# Patient Record
Sex: MALE | Race: WHITE | NOT HISPANIC OR LATINO | Employment: OTHER | URBAN - METROPOLITAN AREA
[De-identification: names, ages, dates, MRNs, and addresses within clinical notes are randomized per-mention and may not be internally consistent; named-entity substitution may affect disease eponyms.]

---

## 2020-08-17 PROBLEM — E11.65 TYPE 2 DIABETES MELLITUS WITH HYPERGLYCEMIA, WITHOUT LONG-TERM CURRENT USE OF INSULIN: Status: ACTIVE | Noted: 2020-08-17

## 2020-08-17 PROBLEM — H25.9 AGE-RELATED CATARACT OF BOTH EYES: Status: ACTIVE | Noted: 2020-08-17

## 2020-09-22 PROBLEM — R19.5 POSITIVE FIT (FECAL IMMUNOCHEMICAL TEST): Status: ACTIVE | Noted: 2020-09-22

## 2021-10-21 PROBLEM — Z12.11 SCREENING FOR COLON CANCER: Status: ACTIVE | Noted: 2021-10-21

## 2022-02-14 PROBLEM — D72.829 LEUKOCYTOSIS: Status: ACTIVE | Noted: 2022-02-14

## 2022-02-16 PROBLEM — D72.829 LEUKOCYTOSIS: Status: RESOLVED | Noted: 2022-02-14 | Resolved: 2022-02-16

## 2022-02-17 ENCOUNTER — PATIENT OUTREACH (OUTPATIENT)
Dept: ADMINISTRATIVE | Facility: CLINIC | Age: 72
End: 2022-02-17

## 2022-03-04 PROBLEM — R11.2 INTRACTABLE NAUSEA AND VOMITING: Status: ACTIVE | Noted: 2022-03-04

## 2022-03-04 PROBLEM — E87.1 HYPONATREMIA: Status: ACTIVE | Noted: 2022-03-04

## 2022-03-07 ENCOUNTER — PATIENT OUTREACH (OUTPATIENT)
Dept: ADMINISTRATIVE | Facility: CLINIC | Age: 72
End: 2022-03-07

## 2022-03-07 NOTE — PROGRESS NOTES
C3 nurse attempted to contact patient for a TCC post hospital discharge follow-up call. The patient declined call at this time.

## 2022-03-13 PROBLEM — N17.9 AKI (ACUTE KIDNEY INJURY): Status: ACTIVE | Noted: 2022-03-13

## 2022-03-14 PROBLEM — N17.9 AKI (ACUTE KIDNEY INJURY): Status: RESOLVED | Noted: 2022-03-13 | Resolved: 2022-03-14

## 2022-03-31 PROBLEM — I95.9 HYPOTENSION: Status: RESOLVED | Noted: 2022-03-31 | Resolved: 2022-03-31

## 2022-03-31 PROBLEM — R63.8 POOR FLUID INTAKE: Status: ACTIVE | Noted: 2022-03-31

## 2022-03-31 PROBLEM — I95.9 HYPOTENSION: Status: ACTIVE | Noted: 2022-03-31

## 2022-03-31 PROBLEM — R57.9 SHOCK: Status: ACTIVE | Noted: 2022-03-31

## 2022-03-31 PROBLEM — A41.9 SEPSIS: Status: ACTIVE | Noted: 2022-03-31

## 2022-04-03 PROBLEM — L02.91 ABSCESS: Status: ACTIVE | Noted: 2022-04-03

## 2022-04-03 PROBLEM — N17.9 AKI (ACUTE KIDNEY INJURY): Status: RESOLVED | Noted: 2022-03-13 | Resolved: 2022-04-03

## 2022-04-03 PROBLEM — A41.9 SEPSIS: Status: RESOLVED | Noted: 2022-03-31 | Resolved: 2022-04-03

## 2022-04-03 PROBLEM — R57.9 SHOCK: Status: RESOLVED | Noted: 2022-03-31 | Resolved: 2022-04-03

## 2022-04-04 PROBLEM — I95.1 ORTHOSTATIC HYPOTENSION: Status: ACTIVE | Noted: 2022-03-31

## 2022-04-15 PROBLEM — E87.1 HYPONATREMIA: Status: RESOLVED | Noted: 2022-03-04 | Resolved: 2022-04-15

## 2022-04-18 PROBLEM — R63.8 POOR FLUID INTAKE: Status: RESOLVED | Noted: 2022-03-31 | Resolved: 2022-04-18

## 2022-05-18 ENCOUNTER — DOCUMENT SCAN (OUTPATIENT)
Dept: HOME HEALTH SERVICES | Facility: HOSPITAL | Age: 72
End: 2022-05-18

## 2022-07-20 PROBLEM — Z98.890 S/P CLOSURE OF ILEOSTOMY: Status: ACTIVE | Noted: 2022-07-20

## 2022-08-16 PROBLEM — I20.0 UNSTABLE ANGINA: Status: ACTIVE | Noted: 2022-08-16

## 2022-08-16 PROBLEM — T81.321A ABDOMINAL WOUND DEHISCENCE: Status: ACTIVE | Noted: 2022-08-16

## 2022-08-16 PROBLEM — L08.9 WOUND INFECTION: Status: ACTIVE | Noted: 2022-08-16

## 2022-08-16 PROBLEM — T81.49XA WOUND INFECTION FOLLOWING PROCEDURE: Status: ACTIVE | Noted: 2022-08-16

## 2022-08-16 PROBLEM — T14.8XXA WOUND INFECTION: Status: ACTIVE | Noted: 2022-08-16

## 2022-08-16 PROBLEM — T81.30XA ABDOMINAL WOUND DEHISCENCE: Status: ACTIVE | Noted: 2022-08-16

## 2022-08-18 ENCOUNTER — PATIENT OUTREACH (OUTPATIENT)
Dept: ADMINISTRATIVE | Facility: CLINIC | Age: 72
End: 2022-08-18

## 2022-11-21 PROBLEM — A41.9 SEPSIS: Status: RESOLVED | Noted: 2022-03-31 | Resolved: 2022-11-21

## 2022-11-21 PROBLEM — N17.9 AKI (ACUTE KIDNEY INJURY): Status: RESOLVED | Noted: 2022-03-13 | Resolved: 2022-11-21

## 2023-01-26 PROBLEM — Z98.890 S/P COLONOSCOPY WITH POLYPECTOMY: Status: ACTIVE | Noted: 2021-10-21

## 2023-02-24 PROBLEM — I70.0 AORTIC ATHEROSCLEROSIS: Status: ACTIVE | Noted: 2023-02-24

## 2023-02-24 PROBLEM — I50.32 CHRONIC DIASTOLIC HEART FAILURE: Status: ACTIVE | Noted: 2023-02-24

## 2023-02-24 PROBLEM — J43.1 PANLOBULAR EMPHYSEMA: Status: ACTIVE | Noted: 2023-02-24

## 2023-06-21 PROBLEM — D50.9 MICROCYTIC ANEMIA: Status: ACTIVE | Noted: 2023-06-21

## 2024-04-02 ENCOUNTER — PATIENT OUTREACH (OUTPATIENT)
Dept: ADMINISTRATIVE | Facility: HOSPITAL | Age: 74
End: 2024-04-02

## 2024-04-02 NOTE — PROGRESS NOTES
Non-compliant report chart audits for [facility], [facility]ACC[facility] Chart review completed for  test overdue (mammograms, Colonoscopies, pap smears, DM labs, and/or EYE EXAMs)      Care Everywhere and media, updates requested and reviewed.     Documentation of last flu shot 2.23.23.      If the patient does not wish to have a Flu vaccine, please document as follows-per MEDICARE guidelines:

## 2024-04-03 PROBLEM — N18.31 CHRONIC KIDNEY DISEASE, STAGE 3A: Status: ACTIVE | Noted: 2024-04-03

## 2024-04-03 PROBLEM — I50.33 ACUTE ON CHRONIC DIASTOLIC HEART FAILURE: Status: ACTIVE | Noted: 2023-02-24

## 2024-04-11 ENCOUNTER — OUTPATIENT CASE MANAGEMENT (OUTPATIENT)
Dept: ADMINISTRATIVE | Facility: OTHER | Age: 74
End: 2024-04-11

## 2024-04-11 NOTE — PROGRESS NOTES
Outpatient Care Management  Patient Does Not Consent    Patient: Socrates Heredia  MRN:  [id number]  Date of Service:  4/11/2024  Completed by:  Triny Schreiber RN    Chief Complaint   Patient presents with    OPCM Enrollment Call    Case Closure       Patient Summary           Consent Received:  Decline  Decline Reason:  Not interested

## 2024-05-03 PROBLEM — Z92.3 HISTORY OF RADIATION THERAPY: Status: ACTIVE | Noted: 2024-05-03

## 2024-05-03 PROBLEM — Z85.048 HISTORY OF RECTAL CANCER: Status: ACTIVE | Noted: 2024-05-03

## 2024-05-03 PROBLEM — D50.0 ANEMIA DUE TO CHRONIC BLOOD LOSS: Status: ACTIVE | Noted: 2024-05-03

## 2024-07-11 ENCOUNTER — PATIENT OUTREACH (OUTPATIENT)
Dept: ADMINISTRATIVE | Facility: HOSPITAL | Age: 74
End: 2024-07-11

## 2024-07-11 DIAGNOSIS — E11.65 TYPE 2 DIABETES MELLITUS WITH HYPERGLYCEMIA, WITHOUT LONG-TERM CURRENT USE OF INSULIN: Primary | ICD-10-CM

## 2024-11-18 PROBLEM — E78.5 HYPERLIPIDEMIA, UNSPECIFIED: Status: ACTIVE | Noted: 2022-02-22

## 2024-11-18 PROBLEM — D50.9 IDA (IRON DEFICIENCY ANEMIA): Status: ACTIVE | Noted: 2022-08-08

## 2024-11-18 PROBLEM — J43.2 CENTRILOBULAR EMPHYSEMA: Status: ACTIVE | Noted: 2024-11-18

## 2024-11-18 PROBLEM — C20 MALIGNANT NEOPLASM OF RECTUM: Status: ACTIVE | Noted: 2022-02-22

## 2024-11-18 PROBLEM — Z95.1 S/P CABG (CORONARY ARTERY BYPASS GRAFT): Status: ACTIVE | Noted: 2024-11-18

## 2024-11-18 PROBLEM — I20.9 ANGINA PECTORIS, UNSPECIFIED: Status: ACTIVE | Noted: 2022-02-22

## 2024-11-18 PROBLEM — Z99.11 ON MECHANICALLY ASSISTED VENTILATION: Status: ACTIVE | Noted: 2024-11-18

## 2024-11-18 PROBLEM — E46 UNSPECIFIED PROTEIN-CALORIE MALNUTRITION: Status: ACTIVE | Noted: 2022-02-22

## 2024-11-18 PROBLEM — I50.33 ACUTE ON CHRONIC DIASTOLIC HEART FAILURE: Status: ACTIVE | Noted: 2022-08-11

## 2024-11-18 PROBLEM — E11.9 TYPE 2 DIABETES MELLITUS, WITHOUT LONG-TERM CURRENT USE OF INSULIN: Status: ACTIVE | Noted: 2022-02-22

## 2024-11-18 PROBLEM — Z85.048 HISTORY OF RECTAL CANCER: Status: ACTIVE | Noted: 2022-08-08

## 2024-11-19 PROBLEM — I97.89 POSTOPERATIVE BRADYCARDIA: Status: ACTIVE | Noted: 2024-11-19

## 2024-11-20 PROBLEM — I97.89 POSTOPERATIVE BRADYCARDIA: Status: RESOLVED | Noted: 2024-11-19 | Resolved: 2024-11-20

## 2024-11-20 PROBLEM — Z99.11 ON MECHANICALLY ASSISTED VENTILATION: Status: RESOLVED | Noted: 2024-11-18 | Resolved: 2024-11-20

## 2024-11-29 ENCOUNTER — PATIENT OUTREACH (OUTPATIENT)
Dept: ADMINISTRATIVE | Facility: CLINIC | Age: 74
End: 2024-11-29

## 2024-11-29 NOTE — PROGRESS NOTES
C3 nurse spoke with Socrates Heredia for a TCC post hospital discharge follow up call. The patient has a scheduled HOSFU appointment with Socrates Shearer on 12/06/24 @ 7415.